# Patient Record
Sex: FEMALE | Race: OTHER | HISPANIC OR LATINO | URBAN - METROPOLITAN AREA
[De-identification: names, ages, dates, MRNs, and addresses within clinical notes are randomized per-mention and may not be internally consistent; named-entity substitution may affect disease eponyms.]

---

## 2018-05-04 ENCOUNTER — EMERGENCY (EMERGENCY)
Facility: HOSPITAL | Age: 23
LOS: 1 days | Discharge: HOME | End: 2018-05-04
Attending: EMERGENCY MEDICINE | Admitting: EMERGENCY MEDICINE
Payer: MEDICAID

## 2018-05-04 VITALS
HEART RATE: 90 BPM | DIASTOLIC BLOOD PRESSURE: 75 MMHG | RESPIRATION RATE: 20 BRPM | OXYGEN SATURATION: 98 % | TEMPERATURE: 98 F | SYSTOLIC BLOOD PRESSURE: 111 MMHG

## 2018-05-04 VITALS — HEIGHT: 63 IN | WEIGHT: 113.1 LBS

## 2018-05-04 LAB
APPEARANCE UR: ABNORMAL
BACTERIA # UR AUTO: ABNORMAL
BILIRUB UR-MCNC: NEGATIVE — SIGNIFICANT CHANGE UP
COLOR SPEC: YELLOW — SIGNIFICANT CHANGE UP
DIFF PNL FLD: ABNORMAL
EPI CELLS # UR: ABNORMAL
GLUCOSE UR QL: NEGATIVE MG/DL — SIGNIFICANT CHANGE UP
HCG UR QL: NEGATIVE — SIGNIFICANT CHANGE UP
KETONES UR-MCNC: ABNORMAL
LEUKOCYTE ESTERASE UR-ACNC: ABNORMAL
NITRITE UR-MCNC: NEGATIVE — SIGNIFICANT CHANGE UP
PH UR: 6 — SIGNIFICANT CHANGE UP (ref 5–8)
PROT UR-MCNC: 15 MG/DL
RBC CASTS # UR COMP ASSIST: ABNORMAL /HPF (ref 0–4)
SP GR SPEC: 1.02 — SIGNIFICANT CHANGE UP (ref 1.01–1.02)
UROBILINOGEN FLD QL: NEGATIVE MG/DL — SIGNIFICANT CHANGE UP
WBC UR QL: SIGNIFICANT CHANGE UP

## 2018-05-04 PROCEDURE — 81001 URINALYSIS AUTO W/SCOPE: CPT

## 2018-05-04 PROCEDURE — 87070 CULTURE OTHR SPECIMN AEROBIC: CPT

## 2018-05-04 PROCEDURE — 81025 URINE PREGNANCY TEST: CPT

## 2018-05-04 PROCEDURE — 87086 URINE CULTURE/COLONY COUNT: CPT

## 2018-05-04 PROCEDURE — 99283 EMERGENCY DEPT VISIT LOW MDM: CPT

## 2018-05-04 RX ORDER — FLUCONAZOLE 150 MG/1
1 TABLET ORAL
Qty: 1 | Refills: 0 | OUTPATIENT
Start: 2018-05-04 | End: 2018-05-04

## 2018-05-04 RX ORDER — FLUCONAZOLE 150 MG/1
150 TABLET ORAL ONCE
Qty: 0 | Refills: 0 | Status: COMPLETED | OUTPATIENT
Start: 2018-05-04 | End: 2018-05-04

## 2018-05-04 RX ADMIN — FLUCONAZOLE 150 MILLIGRAM(S): 150 TABLET ORAL at 21:15

## 2018-05-04 NOTE — ED PROVIDER NOTE - OBJECTIVE STATEMENT
23 yo F presented to ED with c/o vaginal d/c and pruritis x 1 week. Pt has been using OTC topical vagisil cream with no relief. No fevers/chills. No N/V. NO abd pain. no dysuria. No vaginal lesions. NO h/o STDs. Pt is currently not sexually active- last activity 11/2017. Pt denies any PMH and is currently not taking any oral medications.

## 2018-05-04 NOTE — ED PROVIDER NOTE - ATTENDING CONTRIBUTION TO CARE
23 yo F presented to ED with c/o vaginal d/c and pruritis x 1 week. Pt has been using OTC topical vagisil cream with no relief. No fevers/chills. No N/V. NO abd pain. no dysuria. No vaginal lesions. NO h/o STDs. pe in nad abd soft; gyn as per acp; cervical culture, ua

## 2018-05-04 NOTE — ED ADULT TRIAGE NOTE - CHIEF COMPLAINT QUOTE
patient states that she may have a yeast infection and wants to be checked out - states lmp 3/22 and then she began to itch. requesting female MD or PA

## 2018-05-04 NOTE — ED ADULT NURSE NOTE - OBJECTIVE STATEMENT
"I just want someone to check down there" c/o pain x 1 week ago. pt states to think its a yeast infection, denies painful or frequent urination .

## 2018-05-05 LAB
CULTURE RESULTS: SIGNIFICANT CHANGE UP
SPECIMEN SOURCE: SIGNIFICANT CHANGE UP

## 2018-05-06 LAB
CULTURE RESULTS: SIGNIFICANT CHANGE UP
SPECIMEN SOURCE: SIGNIFICANT CHANGE UP

## 2018-05-07 LAB
C TRACH RRNA SPEC QL NAA+PROBE: SIGNIFICANT CHANGE UP
N GONORRHOEA RRNA SPEC QL NAA+PROBE: SIGNIFICANT CHANGE UP
SPECIMEN SOURCE: SIGNIFICANT CHANGE UP

## 2019-06-30 NOTE — ED ADULT NURSE NOTE - CHIEF COMPLAINT QUOTE
patient states that she may have a yeast infection and wants to be checked out - states lmp 3/22 and then she began to itch. requesting female MD or PA Normal for race

## 2020-10-02 NOTE — ED PROVIDER NOTE - CROS ED CARDIOVAS ALL NEG
RX [place] ([place]) reviewed:  reviewed- no concerns  [place] profile:  [email or web address]       negative...

## 2021-01-18 ENCOUNTER — OFFICE VISIT (OUTPATIENT)
Dept: FAMILY MEDICINE | Facility: CLINIC | Age: 26
End: 2021-01-18

## 2021-01-18 VITALS
BODY MASS INDEX: 27.99 KG/M2 | OXYGEN SATURATION: 99 % | TEMPERATURE: 97.6 F | WEIGHT: 168 LBS | DIASTOLIC BLOOD PRESSURE: 68 MMHG | HEIGHT: 65 IN | HEART RATE: 92 BPM | SYSTOLIC BLOOD PRESSURE: 110 MMHG

## 2021-01-18 DIAGNOSIS — F41.9 ANXIETY: ICD-10-CM

## 2021-01-18 DIAGNOSIS — M54.50 CHRONIC BILATERAL LOW BACK PAIN WITHOUT SCIATICA: ICD-10-CM

## 2021-01-18 DIAGNOSIS — M79.662 PAIN OF LEFT LOWER LEG: ICD-10-CM

## 2021-01-18 DIAGNOSIS — G89.29 CHRONIC BILATERAL LOW BACK PAIN WITHOUT SCIATICA: ICD-10-CM

## 2021-01-18 DIAGNOSIS — G47.39 OTHER SLEEP APNEA: ICD-10-CM

## 2021-01-18 DIAGNOSIS — Z00.00 ROUTINE GENERAL MEDICAL EXAMINATION AT A HEALTH CARE FACILITY: Primary | ICD-10-CM

## 2021-01-18 DIAGNOSIS — R06.02 SHORTNESS OF BREATH: ICD-10-CM

## 2021-01-18 PROBLEM — Z76.89 HEALTH CARE HOME: Status: ACTIVE | Noted: 2021-01-18

## 2021-01-18 PROBLEM — Z71.89 ACP (ADVANCE CARE PLANNING): Status: ACTIVE | Noted: 2021-01-18

## 2021-01-18 LAB
CHOLEST SERPL-MCNC: 155 MG/DL (ref 0–199)
CHOLEST/HDLC SERPL: 3 {RATIO} (ref 0–5)
GLUCOSE SERPL-MCNC: 99 MG/DL (ref 60–99)
HDLC SERPL-MCNC: 53 MG/DL (ref 40–150)
LDLC SERPL CALC-MCNC: 91 MG/DL (ref 0–130)
TRIGL SERPL-MCNC: 53 MG/DL (ref 0–149)

## 2021-01-18 PROCEDURE — 99214 OFFICE O/P EST MOD 30 MIN: CPT | Mod: 25 | Performed by: FAMILY MEDICINE

## 2021-01-18 PROCEDURE — 82947 ASSAY GLUCOSE BLOOD QUANT: CPT | Performed by: FAMILY MEDICINE

## 2021-01-18 PROCEDURE — 80061 LIPID PANEL: CPT | Performed by: FAMILY MEDICINE

## 2021-01-18 PROCEDURE — 99385 PREV VISIT NEW AGE 18-39: CPT | Performed by: FAMILY MEDICINE

## 2021-01-18 PROCEDURE — 36415 COLL VENOUS BLD VENIPUNCTURE: CPT | Performed by: FAMILY MEDICINE

## 2021-01-18 RX ORDER — ALBUTEROL SULFATE 90 UG/1
2 AEROSOL, METERED RESPIRATORY (INHALATION) EVERY 6 HOURS
Qty: 1 INHALER | Refills: 1 | Status: SHIPPED | OUTPATIENT
Start: 2021-01-18

## 2021-01-18 SDOH — HEALTH STABILITY: MENTAL HEALTH: HOW MANY STANDARD DRINKS CONTAINING ALCOHOL DO YOU HAVE ON A TYPICAL DAY?: 1 OR 2

## 2021-01-18 SDOH — HEALTH STABILITY: MENTAL HEALTH: HOW OFTEN DO YOU HAVE 6 OR MORE DRINKS ON ONE OCCASION?: NOT ASKED

## 2021-01-18 SDOH — HEALTH STABILITY: MENTAL HEALTH: HOW OFTEN DO YOU HAVE A DRINK CONTAINING ALCOHOL?: 2-3 TIMES A WEEK

## 2021-01-18 ASSESSMENT — MIFFLIN-ST. JEOR: SCORE: 1517.92

## 2021-01-18 ASSESSMENT — PATIENT HEALTH QUESTIONNAIRE - PHQ9
SUM OF ALL RESPONSES TO PHQ QUESTIONS 1-9: 1
5. POOR APPETITE OR OVEREATING: NEARLY EVERY DAY

## 2021-01-18 ASSESSMENT — ANXIETY QUESTIONNAIRES
6. BECOMING EASILY ANNOYED OR IRRITABLE: NEARLY EVERY DAY
2. NOT BEING ABLE TO STOP OR CONTROL WORRYING: MORE THAN HALF THE DAYS
5. BEING SO RESTLESS THAT IT IS HARD TO SIT STILL: NEARLY EVERY DAY
IF YOU CHECKED OFF ANY PROBLEMS ON THIS QUESTIONNAIRE, HOW DIFFICULT HAVE THESE PROBLEMS MADE IT FOR YOU TO DO YOUR WORK, TAKE CARE OF THINGS AT HOME, OR GET ALONG WITH OTHER PEOPLE: SOMEWHAT DIFFICULT
7. FEELING AFRAID AS IF SOMETHING AWFUL MIGHT HAPPEN: NEARLY EVERY DAY
GAD7 TOTAL SCORE: 19
1. FEELING NERVOUS, ANXIOUS, OR ON EDGE: MORE THAN HALF THE DAYS
3. WORRYING TOO MUCH ABOUT DIFFERENT THINGS: NEARLY EVERY DAY

## 2021-01-18 NOTE — PATIENT INSTRUCTIONS
"  Preventive Health Recommendations  Female Ages 21 to 25     Yearly exam:     See your health care provider every year in order to  o Review health changes.   o Discuss preventive care.    o Review your medicines if your doctor has prescribed any.      You should be tested each year for STDs (sexually transmitted diseases).       Talk to your provider about how often you should have cholesterol testing.      Get a Pap test every three years. If you have an abnormal result, your doctor may have you test more often.      If you are at risk for diabetes, you should have a diabetes test (fasting glucose).     Shots:     Get a flu shot each year.     Get a tetanus shot every 10 years.     Consider getting the shot (vaccine) that prevents cervical cancer (Gardasil).    Nutrition:     Eat at least 5 servings of fruits and vegetables each day.    Eat whole-grain bread, whole-wheat pasta and brown rice instead of white grains and rice.    Get adequate Calcium and Vitamin D.     Lifestyle    Exercise at least 150 minutes a week each week (30 minutes a day, 5 days a week). This will help you control your weight and prevent disease.    Limit alcohol to one drink per day.    No smoking.     Wear sunscreen to prevent skin cancer.    See your dentist every six months for an exam and cleaning.  Estimated body mass index is 27.96 kg/m  as calculated from the following:    Height as of this encounter: 1.651 m (5' 5\").    Weight as of this encounter: 76.2 kg (168 lb).  "

## 2021-01-18 NOTE — NURSING NOTE
Ana is here to establish care and have fasting CPX without pap    Pre-visit Screening:  Immunizations:  Declines flu discuss Tdap  Colonoscopy:  NA  Mammogram: NA  Asthma Action Test/Plan:  Diagnosed with asthma at age 20 but no meds done  PHQ9:  None  GAD7:  None  Questioned patient about current smoking habits Pt. has never smoked.  Ok to leave detailed message on voice mail for today's visit only Yes, phone # [phone]

## 2021-01-18 NOTE — PROGRESS NOTES
SUBJECTIVE:   CC: Ana Cano is an 25 year old woman who presents for preventive health visit.     Patient has been advised of split billing requirements and indicates understanding: Yes     Has been tested for covid, neg. Breathing has been difficult for the past 2 months. Stuffy nose, feels like she's not getting enough oxygen in . Thought at some point, wasn't known exposure. Has been told in the past that she has asthma around age 20.    Injured her back in august, during sex and since then has had pain, recently pain into legs. Left leg gets weird sharp pains down to the foot throughout the day. Has to stand with back forward at work, pharmacy tech, is standing all day. Has history of scoliosis.  Also has had anxiety, was treated in the past with alprazolam. Wondering about this. Not really having depression. Personal and family history neg for bipolar.  Has wondered in the past about sleep apnea possibly.    Healthy Habits:    Do you get at least three servings of calcium containing foods daily (dairy, green leafy vegetables, etc.)? yes    Amount of exercise or daily activities, outside of work: none.    Problems taking medications regularly not applicable    Medication side effects: No    Have you had an eye exam in the past two years? yes    Do you see a dentist twice per year? yes    Do you have sleep apnea, excessive snoring or daytime drowsiness?yes    Wants sleep apnea testing.      Today's PHQ-2 Score:   PHQ-2 ( 1999 Pfizer) 1/18/2021   Q1: Little interest or pleasure in doing things 0   Q2: Feeling down, depressed or hopeless 0   PHQ-2 Score 0       Do you feel safe in your environment? Yes        Social History     Tobacco Use     Smoking status: Never Smoker     Smokeless tobacco: Never Used   Substance Use Topics     Alcohol use: Yes     Frequency: 2-3 times a week     Drinks per session: 1 or 2     If you drink alcohol do you typically have >3 drinks per day or >7 drinks per week? No                      Reviewed orders with patient.  Reviewed health maintenance and updated orders accordingly - Yes  BP Readings from Last 3 Encounters:   01/18/21 110/68    Wt Readings from Last 3 Encounters:   01/18/21 76.2 kg (168 lb)                  Patient Active Problem List   Diagnosis     ACP (advance care planning)     Health Care Home     Anxiety     No past surgical history on file.    Social History     Tobacco Use     Smoking status: Never Smoker     Smokeless tobacco: Never Used   Substance Use Topics     Alcohol use: Yes     Frequency: 2-3 times a week     Drinks per session: 1 or 2     No family history on file.      Current Outpatient Medications   Medication Sig Dispense Refill     albuterol (PROAIR HFA/PROVENTIL HFA/VENTOLIN HFA) 108 (90 Base) MCG/ACT inhaler Inhale 2 puffs into the lungs every 6 hours 1 Inhaler 1     fluticasone-salmeterol (ADVAIR) 250-50 MCG/DOSE inhaler Inhale 1 puff into the lungs every 12 hours 1 each 3     sertraline (ZOLOFT) 50 MG tablet Take 1 tablet (50 mg) by mouth daily 90 tablet 0           Pertinent mammograms are reviewed under the imaging tab.  History of abnormal Pap smear:  Due for a pap, will have this done with gynecology     Reviewed and updated as needed this visit by clinical staff  Tobacco  Allergies  Meds  Problems             Reviewed and updated as needed this visit by Provider                No past medical history on file.     ROS:  CONSTITUTIONAL: NEGATIVE for fever, chills, change in weight  INTEGUMENTARU/SKIN: NEGATIVE for worrisome rashes, moles or lesions  EYES: NEGATIVE for vision changes or irritation  ENT: NEGATIVE for ear, mouth and throat problems  RESP: NEGATIVE for significant cough or SOB  BREAST: NEGATIVE for masses, tenderness or discharge  CV: NEGATIVE for chest pain, palpitations or peripheral edema  GI: NEGATIVE for nausea, abdominal pain, heartburn, or change in bowel habits  : NEGATIVE for unusual urinary or vaginal symptoms.  "Periods are regular.  MUSCULOSKELETAL: NEGATIVE for significant arthralgias or myalgia  NEURO: NEGATIVE for weakness, dizziness or paresthesias  PSYCHIATRIC: NEGATIVE for changes in mood or affect    Positives: weight gain, back pain, breathing problems    OBJECTIVE:   /68 (BP Location: Right arm, Patient Position: Sitting, Cuff Size: Adult Large)   Pulse 92   Temp 97.6  F (36.4  C) (Oral)   Ht 1.651 m (5' 5\")   Wt 76.2 kg (168 lb)   LMP 01/07/2021   SpO2 99%   BMI 27.96 kg/m    EXAM:  GENERAL: healthy, alert and no distress  EYES: Eyes grossly normal to inspection, PERRL and conjunctivae and sclerae normal  HENT: ear canals and TM's normal, nose and mouth without ulcers or lesions  NECK: no adenopathy, no asymmetry, masses, or scars and thyroid normal to palpation  RESP: lungs clear to auscultation - no rales, rhonchi or wheezes  BREAST: normal without masses, tenderness or nipple discharge and no palpable axillary masses or adenopathy  CV: regular rate and rhythm, normal S1 S2, no S3 or S4, no murmur, click or rub, no peripheral edema and peripheral pulses strong  ABDOMEN: soft, nontender, no hepatosplenomegaly, no masses and bowel sounds normal   (female): normal female external genitalia, normal urethral meatus, vaginal mucosa pink, moist, well rugated, and normal cervix/adnexa/uterus without masses or discharge  MS: no gross musculoskeletal defects noted, no edema  SKIN: no suspicious lesions or rashes  NEURO: Normal strength and tone, mentation intact and speech normal  PSYCH: mentation appears normal, affect normal/bright    Diagnostic Test Results:  Labs reviewed in Epic  Results for orders placed or performed in visit on 01/18/21 (from the past 24 hour(s))   Glucose Fasting (BFP)   Result Value Ref Range    Glucose 99 60 - 99 mg/dL     Results for orders placed or performed in visit on 01/18/21   Glucose Fasting (BFP)     Status: None   Result Value Ref Range    Glucose 99 60 - 99 mg/dL " "      ASSESSMENT/PLAN:   1. Routine general medical examination at a health care facility  She will see gynecology for her pap.  - Lipid Panel (BFP)  - Glucose Fasting (BFP)    2. Anxiety  Start sertraline, side effects including FDA warning discussed. followup to recheck in 1 month.  - TSH with free T4 reflex ([facility])  - VENOUS COLLECTION  - sertraline (ZOLOFT) 50 MG tablet; Take 1 tablet (50 mg) by mouth daily  Dispense: 90 tablet; Refill: 0    3. Pain of left lower leg  Start with physical therapy, followup to recheck in 1 month.  - PHYSICAL THERAPY REFERRAL; Future    4. Shortness of breath  Possible covid although she tested negative. Also possible history of asthma. Start with daily inhalers and recheck in a month.  - fluticasone-salmeterol (ADVAIR) 250-50 MCG/DOSE inhaler; Inhale 1 puff into the lungs every 12 hours  Dispense: 1 each; Refill: 3  - albuterol (PROAIR HFA/PROVENTIL HFA/VENTOLIN HFA) 108 (90 Base) MCG/ACT inhaler; Inhale 2 puffs into the lungs every 6 hours  Dispense: 1 Inhaler; Refill: 1    5. Chronic bilateral low back pain without sciatica  Referral to physical therapy.  - PHYSICAL THERAPY REFERRAL; Future    6. Other sleep apnea  Referral for sleep study.  - SLEEP EVALUATION & MANAGEMENT REFERRAL - ADULT -[facility] - Numerous Locations - [phone]    Patient has been advised of split billing requirements and indicates understanding: Yes  COUNSELING:   Reviewed preventive health counseling, as reflected in patient instructions       Regular exercise       Healthy diet/nutrition    Estimated body mass index is 27.96 kg/m  as calculated from the following:    Height as of this encounter: 1.651 m (5' 5\").    Weight as of this encounter: 76.2 kg (168 lb).    Weight management plan: Discussed healthy diet and exercise guidelines    She reports that she has never smoked. She has never used smokeless tobacco.        Blanca Osman MD  [facility]  "

## 2021-01-18 NOTE — LETTER
[facility]  [address]  [address]  [facility] [address]  [phone]      January 18, 2021      Ana Cano  [address]   [place] [place] [facility] [address]      EMERGENCY CARE PLAN  Presenting Problem Treatment Plan   Questions or concerns during clinic hours I will call the clinic directly:    [facility]  [address] , [address]  [place] [address]  [phone]   Questions or concerns outside clinic hours  I will call the 24 hour line at [phone]   Patient needs to schedule an appointment  I will call the  scheduling line at [phone]   Same day treatment   I will call the clinic first, then  urgent care and/or  express care if needed   Clinic Care Coordinators Luz Avery RN:  [phone]  [facility] Clinical Support Staff: [phone]    Crisis Services:  Behavioral or Mental Health P (Behavioral Health Providers)   [phone]   Emergency treatment--Immediately CALL 251

## 2021-01-19 ENCOUNTER — VIRTUAL VISIT (OUTPATIENT)
Dept: FAMILY MEDICINE | Facility: CLINIC | Age: 26
End: 2021-01-19
Payer: COMMERCIAL

## 2021-01-19 ENCOUNTER — TELEPHONE (OUTPATIENT)
Dept: FAMILY MEDICINE | Facility: CLINIC | Age: 26
End: 2021-01-19

## 2021-01-19 DIAGNOSIS — F41.9 ANXIETY: ICD-10-CM

## 2021-01-19 DIAGNOSIS — R41.840 ATTENTION DEFICIT: Primary | ICD-10-CM

## 2021-01-19 LAB — TSH SERPL-ACNC: 0.87 MIU/L

## 2021-01-19 PROCEDURE — 99214 OFFICE O/P EST MOD 30 MIN: CPT | Mod: 95 | Performed by: PHYSICIAN ASSISTANT

## 2021-01-19 RX ORDER — HYDROXYZINE HYDROCHLORIDE 25 MG/1
25-50 TABLET, FILM COATED ORAL EVERY 6 HOURS PRN
Qty: 60 TABLET | Refills: 1 | Status: SHIPPED | OUTPATIENT
Start: 2021-01-19 | End: 2021-04-06

## 2021-01-19 SDOH — ECONOMIC STABILITY: FOOD INSECURITY: WITHIN THE PAST 12 MONTHS, THE FOOD YOU BOUGHT JUST DIDN'T LAST AND YOU DIDN'T HAVE MONEY TO GET MORE.: NOT ASKED

## 2021-01-19 SDOH — ECONOMIC STABILITY: TRANSPORTATION INSECURITY
IN THE PAST 12 MONTHS, HAS THE LACK OF TRANSPORTATION KEPT YOU FROM MEDICAL APPOINTMENTS OR FROM GETTING MEDICATIONS?: NOT ASKED

## 2021-01-19 SDOH — ECONOMIC STABILITY: FOOD INSECURITY: WITHIN THE PAST 12 MONTHS, YOU WORRIED THAT YOUR FOOD WOULD RUN OUT BEFORE YOU GOT MONEY TO BUY MORE.: NOT ASKED

## 2021-01-19 SDOH — ECONOMIC STABILITY: TRANSPORTATION INSECURITY
IN THE PAST 12 MONTHS, HAS LACK OF TRANSPORTATION KEPT YOU FROM MEETINGS, WORK, OR FROM GETTING THINGS NEEDED FOR DAILY LIVING?: NOT ASKED

## 2021-01-19 SDOH — ECONOMIC STABILITY: INCOME INSECURITY: HOW HARD IS IT FOR YOU TO PAY FOR THE VERY BASICS LIKE FOOD, HOUSING, MEDICAL CARE, AND HEATING?: NOT ASKED

## 2021-01-19 ASSESSMENT — ANXIETY QUESTIONNAIRES: GAD7 TOTAL SCORE: 19

## 2021-01-19 NOTE — PROGRESS NOTES
Ana is a 25 year old who is being evaluated via a billable video visit.      How would you like to obtain your AVS? MyChart  If the video visit is dropped, the invitation should be resent by: Text to cell phone: [phone]  Will anyone else be joining your video visit? No    Video Start Time: 1:02  Assessment & Plan     Anxiety  Present on history and evidence on ck 7 from yesterday. Discussed diagnosis and how ssris manage this. Patient is willing to attempt zoloft. Recommending starting at half dose for 1-2 weeks then increase to full. Also educated on benzodiazipines are not a good option for chronic management of anxiety due to controlled nature and addictive properties. Will give atarax prn to allow zoloft to take effect and recommending follow up with me for virtual visit in 1 month.   - sertraline (ZOLOFT) 50 MG tablet; Take 1/2 tab (25 mg) daily for 1-2 weeks, then increase to 1 tab (50 mg) daily.  - hydrOXYzine (ATARAX) 25 MG tablet; Take 1-2 tablets (25-50 mg) by mouth every 6 hours as needed for anxiety  -Medication use and side effects discussed with the patient. Patient is in complete understanding and agreement with plan.       Attention deficit  Reported. No noted subjective diagnosis of adhd however does state was on stratterra in the past. Will have formal assessment. Referral given.   - MENTAL HEALTH REFERRAL  - Adult; Assessments and Testing; ADHD; [facility] [phone]; We will contact you to schedule the appointment or please call with any questions      52 minutes spent on the date of the encounter doing chart review, patient visit and documentation     Return in about 1 month (around 2/19/2021) for depression/anxiety follow up virtual .    Venancio Garcia PA-C  [facility]    Stephan Perez is a 25 year old who presents to clinic today for the following health issues     HPI       Anxiety Follow-Up    How are you doing  with your anxiety since your last visit? Worsened     Are you having other symptoms that might be associated with anxiety? No    Have you had a significant life event? No     Are you feeling depressed? No    Do you have any concerns with your use of alcohol or other drugs? No    Social History     Tobacco Use     Smoking status: Never Smoker     Smokeless tobacco: Never Used   Substance Use Topics     Alcohol use: Yes     Frequency: 2-3 times a week     Drinks per session: 1 or 2     Drug use: Never     KAT-7 SCORE 1/18/2021   Total Score 19     PHQ 1/18/2021   PHQ-9 Total Score 1   Q9: Thoughts of better off dead/self-harm past 2 weeks Not at all     Last PHQ-9 1/18/2021   1.  Little interest or pleasure in doing things 0   2.  Feeling down, depressed, or hopeless 0   3.  Trouble falling or staying asleep, or sleeping too much 0   4.  Feeling tired or having little energy 0   5.  Poor appetite or overeating 0   6.  Feeling bad about yourself 0   7.  Trouble concentrating 1   8.  Moving slowly or restless 0   Q9: Thoughts of better off dead/self-harm past 2 weeks 0   PHQ-9 Total Score 1   Difficulty at work, home, or with people Not difficult at all     KAT-7  1/18/2021   1. Feeling nervous, anxious, or on edge 2   2. Not being able to stop or control worrying 2   3. Worrying too much about different things 3   4. Trouble relaxing 3   5. Being so restless that it is hard to sit still 3   6. Becoming easily annoyed or irritable 3   7. Feeling afraid, as if something awful might happen 3   KAT-7 Total Score 19   If you checked any problems, how difficult have they made it for you to do your work, take care of things at home, or get along with other people? Somewhat difficult         Of note, was seen by Dr. Osman yesterday for preventative exam but had other concerns and patient did not feel she had her anxiety concerns addressed appropriately.     States for years has suffered from anxiety. As a child was managed  "with xanax and stratterra for anxiety. Not currently taking any medications and now her stress levels are high and it has effected her concentration. She is also concerned for possible ADHD and does not believe she has been formally diagnosed with this in the past.       Review of Systems   Constitutional, neuro, psych, cardiovascular, pulmonary systems are negative, except as otherwise noted.      Objective    Vitals - Patient Reported  Weight (Patient Reported): 76.2 kg (168 lb)  Height (Patient Reported): 165.1 cm (5' 5\")  BMI (Based on Pt Reported Ht/Wt): 27.96      Vitals:  No vitals were obtained today due to virtual visit.    Physical Exam   GENERAL: Healthy, alert and no distress  EYES: Eyes grossly normal to inspection.  No discharge or erythema, or obvious scleral/conjunctival abnormalities.  RESP: No audible wheeze, cough, or visible cyanosis.  No visible retractions or increased work of breathing.    SKIN: Visible skin clear. No significant rash, abnormal pigmentation or lesions.  NEURO: Cranial nerves grossly intact.  Mentation and speech appropriate for age.  PSYCH: Mentation appears normal, affect normal/bright, judgement and insight intact, normal speech and appearance well-groomed.            Video-Visit Details    Type of service:  Video Visit    Video End Time:1:40    Originating Location (pt. Location): Home    Distant Location (provider location):  [facility]     Platform used for Video Visit: Ad"

## 2021-01-19 NOTE — TELEPHONE ENCOUNTER
Called patient, left msg to call clinic. Need to schedule f/u appointment.  Return in about 1 month (around 2/19/2021) for depression/anxiety follow up anaid .  Ruth Behrens.

## 2021-01-22 ENCOUNTER — TELEPHONE (OUTPATIENT)
Dept: FAMILY MEDICINE | Facility: CLINIC | Age: 26
End: 2021-01-22

## 2021-01-22 NOTE — TELEPHONE ENCOUNTER
We did not reach Ana Cano, we left a message with our contact number [phone].  If we do not hear from them within the next 5 business days we will mail a letter offering our scheduling services.    If they do call to schedule, in the future, we will inform you of the outcome.    Thank you for your referral,  [facility]

## 2021-02-08 ENCOUNTER — OFFICE VISIT (OUTPATIENT)
Dept: FAMILY MEDICINE | Facility: CLINIC | Age: 26
End: 2021-02-08

## 2021-02-08 ENCOUNTER — TELEPHONE (OUTPATIENT)
Dept: FAMILY MEDICINE | Facility: CLINIC | Age: 26
End: 2021-02-08

## 2021-02-08 VITALS
SYSTOLIC BLOOD PRESSURE: 110 MMHG | HEART RATE: 82 BPM | BODY MASS INDEX: 27.66 KG/M2 | WEIGHT: 166 LBS | OXYGEN SATURATION: 97 % | HEIGHT: 65 IN | TEMPERATURE: 98.1 F | DIASTOLIC BLOOD PRESSURE: 60 MMHG

## 2021-02-08 DIAGNOSIS — L30.9 DERMATITIS: Primary | ICD-10-CM

## 2021-02-08 DIAGNOSIS — F41.9 ANXIETY: ICD-10-CM

## 2021-02-08 DIAGNOSIS — L70.0 ACNE VULGARIS: ICD-10-CM

## 2021-02-08 PROCEDURE — 99214 OFFICE O/P EST MOD 30 MIN: CPT | Performed by: PHYSICIAN ASSISTANT

## 2021-02-08 RX ORDER — PREDNISONE 10 MG/1
10 TABLET ORAL 2 TIMES DAILY
Qty: 10 TABLET | Refills: 0 | Status: SHIPPED | OUTPATIENT
Start: 2021-02-08 | End: 2021-02-13

## 2021-02-08 RX ORDER — DOXYCYCLINE HYCLATE 100 MG
100 TABLET ORAL 2 TIMES DAILY
Qty: 180 TABLET | Refills: 0 | Status: SHIPPED | OUTPATIENT
Start: 2021-02-08 | End: 2021-04-06

## 2021-02-08 RX ORDER — NORETHINDRONE ACETATE AND ETHINYL ESTRADIOL AND FERROUS FUMARATE 1MG-20(21)
1 KIT ORAL DAILY
COMMUNITY
Start: 2021-01-26

## 2021-02-08 RX ORDER — FLUOXETINE 10 MG/1
10 CAPSULE ORAL DAILY
Qty: 30 CAPSULE | Refills: 0 | Status: SHIPPED | OUTPATIENT
Start: 2021-02-08 | End: 2021-04-06

## 2021-02-08 RX ORDER — IBUPROFEN 600 MG/1
TABLET, FILM COATED ORAL
COMMUNITY
Start: 2021-01-28

## 2021-02-08 ASSESSMENT — MIFFLIN-ST. JEOR: SCORE: 1508.85

## 2021-02-08 NOTE — PROGRESS NOTES
"CC: Skin reaction, acne, mental health    History:  Skin reaction  Crystal tried a new acne product for the first time on her face last night in the shower. PanOxl 10% benzoyl peroxide. Seemed normal, but then waking up this morning, right eye upper and lower eyelid were itching and swollen. No irritation of eye, tearing, discharge. Over the course of the day, right eye seems to get better, but left eye is getting worse. Has tried 2 other benzoyl peroxide products, and both have caused similar reactions either immediately or after a few uses.    Acne:  Wonders what else she can use besides this. Has been on doxycycline BID before at this worked well. She also may return to dermatology in the near future to consider Accutane therapy.     Anxiety:  Was seen by my colleague Dr. Osman 1/18/2021 for physical and had mentioned history of anxiety that she used to have to take Xanax for. Dr Osman had prescribed sertraline, which patient started taking, but asks me today if is normal to have frequent nightmares while taking. She explains this is totally new for her to have this. Has not tried other daily medications in the past.     PMH, MEDICATIONS, ALLERGIES, SOCIAL AND FAMILY HISTORY in [facility] and reviewed by me personally.    ROS negative other than the symptoms noted above in the HPI.    Examination   /60 (BP Location: Left arm, Patient Position: Sitting, Cuff Size: Adult Large)   Pulse 82   Temp 98.1  F (36.7  C) (Oral)   Ht 1.651 m (5' 5\")   Wt 75.3 kg (166 lb)   LMP 02/04/2021 (Exact Date)   SpO2 97%   BMI 27.62 kg/m         Constitutional: Sitting comfortably, in no acute distress. Vital signs noted  Eyes: pupils equal round reactive to light and accomodation, extra ocular movements intact  Ears: external canals and TMs free of abnormalities  Nose: patent, without mucosal abnormalities  Mouth and throat: without erythema or lesions of the mucosa  Neck:  no adenopathy, trachea midline and normal to " palpation, thyroid normal to palpation  Cardiovascular:  regular rate and rhythm, no murmurs, clicks, or gallops  Respiratory:  normal respiratory rate and rhythm, lungs clear to auscultation  SKIN: No jaundice/pallor. Mild edema and erythema of right and left upper eyelids.   Psychiatric: mentation appears normal and affect normal/bright        A/P    ICD-10-CM    1. Dermatitis  L30.9 predniSONE (DELTASONE) 10 MG tablet   2. Acne vulgaris  L70.0 doxycycline hyclate (VIBRA-TABS) 100 MG tablet   3. Anxiety  F41.9 FLUoxetine (PROZAC) 10 MG capsule       DISCUSSION:  Allergic dermatitis: For this allergic dermatitis, reassured she is already seeing significant improvement.  Ideally try taking Benadryl and/or Allegra/Zyrtec/Claritin. If still happening over the next 2-3 days, okay to start prednisone 10 mg twice daily with food. Warned of side effects like drowsiness, jitteriness, nausea, diarrhea, constipation, weight changes, irritability, mood swings, and to contact me if noted.     Acne: Avoid benzoly peroxide medications in the future. For ongoing acne, consider Differin gel every other night    Will also restart on doxycycline 1 pill twice daily with food for 3 months. Avoid taking with birth control as OCP contains iron.    Anxiety:  Significant side efffects on sertraline 50 mg,so will do trial on fluoxetine 10 instead. Can switch directly from one onto the other. Warned of side effects, and advised take with food. Recheck in 1 month virtual or in person, or contact me sooner if side effects, or nightmares not resolved.     follow up visit: 1 month    Lenka Lynn PA-C  [facility]

## 2021-02-08 NOTE — Clinical Note
JAELYN. Pt had nightmares on sertraline brought up at her unrelated appt. Advised switch to fluoxetine, recheck in 1 month

## 2021-02-08 NOTE — LETTER
[facility]  [address], [address]  [place]  [address]    February 8, 2021        Ana Cano  [address] [address]   [place] [address]              To Whom It May Concern:    Ana is a patient at our clinic seen on the evening of 2/8/2021 for an acute illness. Please excuse her from work responsibilities 2/8/2021. She may return to work without restriction 2/9/2021.     If you have any further questions or problems, please contact our office at [phone].      Lenka Lynn PA-C

## 2021-02-08 NOTE — NURSING NOTE
Ana is here for a rash that happens when she tries to use acne products on her face. They make her eyes swell up.    Pre-Visit Screening:  Immunizations:declined shots  Colonoscopy:NA  Mammogram:NA  Asthma Action Test/Plan:Na  PHQ9:NA  GAD7:Na  Questioned patient about current smoking habits Pt.never smoked  OK to leave a detailed message on voice mail for today's visit yes, phone # [phone]

## 2021-02-08 NOTE — PATIENT INSTRUCTIONS
Avoid benzoly peroxide medications in the future    Significan side efffects on sertraline,so will do trial on fluoxetine instead. Recheck in 1 month virtual or in person.    For the rash, ideally try Benadryl and/or Allegra/Zyrtec/Claritin. If still happening, okay to start prednisone 10 mg twice daily with food.    For ongoing acne, consider Differin gel every other night.     Will also restart on doxycycline 1 pill twice daily with food for 3 months. Avoid taking with birth control.

## 2021-02-08 NOTE — TELEPHONE ENCOUNTER
Pt needs a letter saying she missed work today and came to the doctor, she is okay with you sending it by ghassan. Thanks

## 2021-03-07 ENCOUNTER — HEALTH MAINTENANCE LETTER (OUTPATIENT)
Age: 26
End: 2021-03-07

## 2021-03-21 ENCOUNTER — OFFICE VISIT (OUTPATIENT)
Dept: URGENT CARE | Facility: URGENT CARE | Age: 26
End: 2021-03-21
Payer: COMMERCIAL

## 2021-03-21 VITALS
HEIGHT: 65 IN | OXYGEN SATURATION: 100 % | RESPIRATION RATE: 18 BRPM | DIASTOLIC BLOOD PRESSURE: 71 MMHG | WEIGHT: 166 LBS | BODY MASS INDEX: 27.66 KG/M2 | SYSTOLIC BLOOD PRESSURE: 108 MMHG | HEART RATE: 90 BPM | TEMPERATURE: 98.3 F

## 2021-03-21 DIAGNOSIS — L08.9 LOCAL SKIN INFECTION: Primary | ICD-10-CM

## 2021-03-21 PROCEDURE — 99214 OFFICE O/P EST MOD 30 MIN: CPT | Performed by: PHYSICIAN ASSISTANT

## 2021-03-21 RX ORDER — MUPIROCIN 20 MG/G
OINTMENT TOPICAL 3 TIMES DAILY
Qty: 30 G | Refills: 0 | Status: SHIPPED | OUTPATIENT
Start: 2021-03-21 | End: 2021-03-28

## 2021-03-21 RX ORDER — CEPHALEXIN 500 MG/1
500 CAPSULE ORAL 3 TIMES DAILY
Qty: 21 CAPSULE | Refills: 0 | Status: SHIPPED | OUTPATIENT
Start: 2021-03-21 | End: 2021-03-28

## 2021-03-21 ASSESSMENT — MIFFLIN-ST. JEOR: SCORE: 1508.85

## 2021-03-21 NOTE — PATIENT INSTRUCTIONS
Exam today suggestive of skin infection. Please take antibiotic as prescribed. Please follow up if any worsening symptoms.

## 2021-03-21 NOTE — PROGRESS NOTES
"Assessment & Plan     Local skin infection, dorsum of left hand  Suspect local skin infection from initial insect bite.  Keflex is prescribed today.  Mupirocin ointment is also prescribed.  Keep monitoring symptoms.  Follow-up if any worsening symptoms.  She agrees with the plan.  - cephALEXin (KEFLEX) 500 MG capsule  Dispense: 21 capsule; Refill: 0  - mupirocin (BACTROBAN) 2 % external ointment  Dispense: 30 g; Refill: 0         Return in about 1 week (around 3/28/2021) for Symptoms failing to improve.    Ira Branch PA-C  [facility] DIEGO Perez is a 25 year old female who presents to clinic today for the following health issues:  Chief Complaint   Patient presents with     Urgent Care     Derm Problem     left hand red swollen, 2 bumps on right hand     HPI      Derm problem    Onset of symptoms was a few days ago.  Course of illness is worsening.    Severity moderate  Current and Associated symptoms: swelling, redness, pain on the dorsum of the left hand.  She reports possibly an insect bite on the dorsum of the left hand just below the left index finger several days ago.  A few days ago tried squeezing that area. She also has 2 other bumps noted on the right hand. They were itchy at first, not so much now.  Denies fever/chills. No new lotions, soaps detergents. No injury to the affected area.  Treatment measures tried include None tried.  Predisposing factors include None.    Of note, patient was prescribed Doxycycline for Acne about a month ago, has not been taking it.       Review of Systems  Constitutional, HEENT, cardiovascular, pulmonary, gi and gu systems are negative, except as otherwise noted.      Objective    /71   Pulse 90   Temp 98.3  F (36.8  C) (Oral)   Resp 18   Ht 1.651 m (5' 5\")   Wt 75.3 kg (166 lb)   SpO2 100%   BMI 27.62 kg/m    Physical Exam   GENERAL: healthy, alert and no distress  SKIN: There is erythema noted on the dorsum of " the left hand by the second and third metacarpal area, there is a scab noted at the first MCP area, redness/erythema is spreading distally on the second and third fingers.  There is tenderness to palpation. Red streaking noted. No evidence of abscess. ROM left wrist and fingers is normal.   On the dorsum of the right hand she has 2 vesicles noted. One is below the 3rd finger and second one is lateral to the pinky. No tenderness to palpation. No drainage.

## 2021-04-05 NOTE — PROGRESS NOTES
"Assessment & Plan   Problem List Items Addressed This Visit        Other    Anxiety - Primary    Relevant Medications    sertraline (ZOLOFT) 50 MG tablet    sertraline (ZOLOFT) 100 MG tablet         1. Anxiety  I will increase sertraline, side effects discussed. With libido concerns, let me know--we could consider adding wellbutrin.   - sertraline (ZOLOFT) 100 MG tablet; Take 1 tablet (100 mg) by mouth daily  Dispense: 90 tablet; Refill: 0             FUTURE APPOINTMENTS:       - Follow-up visit in 3 months    No follow-ups on file.    Blanca Osman MD  [facility]    Subjective     Nursing Notes:   Radha Caruso, CMA  4/6/2021  9:15 AM  Signed  Ana is here for fasting med check    Pre-visit Screening:  Immunizations:  up to date  Colonoscopy:  NA  Mammogram: NA  Asthma Action Test/Plan:  NA  PHQ9:  Done today  GAD7:  Done today  Questioned patient about current smoking habits Pt. has never smoked.  Ok to leave detailed message on voice mail for today's visit only Yes, phone # [phone]           Ana Cano is a 25 year old female who presents to clinic today for the following health issues   HPI     Here to followup on her anxiety, has been on sertraline since January, would like to increase the dose. Initially was having nightmares but this is better. Was given fluoxetine and hydroxyzine but didn't change this.  She has also noted some decreased libido, but this started before starting the sertraline.    Has some periods irregular--it was a week late once. But then she got it. Now waiting for next period.      Review of Systems   Constitutional, HEENT, cardiovascular, pulmonary, gi and gu systems are negative, except as otherwise noted.      Objective    /64 (BP Location: Left arm, Patient Position: Sitting, Cuff Size: Adult Large)   Pulse 95   Temp 98.2  F (36.8  C) (Oral)   Ht 1.657 m (5' 5.25\")   Wt 74.8 kg (165 lb)   LMP 03/11/2021   SpO2 97%   BMI 27.25 kg/m  "   Body mass index is 27.25 kg/m .  Physical Exam   GENERAL: healthy, alert and no distress  MS: no gross musculoskeletal defects noted, no edema  PSYCH: mentation appears normal, affect normal/bright

## 2021-04-06 ENCOUNTER — OFFICE VISIT (OUTPATIENT)
Dept: FAMILY MEDICINE | Facility: CLINIC | Age: 26
End: 2021-04-06

## 2021-04-06 VITALS
BODY MASS INDEX: 27.49 KG/M2 | SYSTOLIC BLOOD PRESSURE: 100 MMHG | TEMPERATURE: 98.2 F | DIASTOLIC BLOOD PRESSURE: 64 MMHG | HEIGHT: 65 IN | HEART RATE: 95 BPM | WEIGHT: 165 LBS | OXYGEN SATURATION: 97 %

## 2021-04-06 DIAGNOSIS — F41.9 ANXIETY: Primary | ICD-10-CM

## 2021-04-06 PROCEDURE — 99214 OFFICE O/P EST MOD 30 MIN: CPT | Performed by: FAMILY MEDICINE

## 2021-04-06 RX ORDER — SERTRALINE HYDROCHLORIDE 100 MG/1
100 TABLET, FILM COATED ORAL DAILY
Qty: 90 TABLET | Refills: 0 | Status: SHIPPED | OUTPATIENT
Start: 2021-04-06

## 2021-04-06 ASSESSMENT — ANXIETY QUESTIONNAIRES
IF YOU CHECKED OFF ANY PROBLEMS ON THIS QUESTIONNAIRE, HOW DIFFICULT HAVE THESE PROBLEMS MADE IT FOR YOU TO DO YOUR WORK, TAKE CARE OF THINGS AT HOME, OR GET ALONG WITH OTHER PEOPLE: SOMEWHAT DIFFICULT
6. BECOMING EASILY ANNOYED OR IRRITABLE: SEVERAL DAYS
3. WORRYING TOO MUCH ABOUT DIFFERENT THINGS: SEVERAL DAYS
GAD7 TOTAL SCORE: 6
7. FEELING AFRAID AS IF SOMETHING AWFUL MIGHT HAPPEN: NOT AT ALL
1. FEELING NERVOUS, ANXIOUS, OR ON EDGE: SEVERAL DAYS
2. NOT BEING ABLE TO STOP OR CONTROL WORRYING: SEVERAL DAYS
5. BEING SO RESTLESS THAT IT IS HARD TO SIT STILL: SEVERAL DAYS

## 2021-04-06 ASSESSMENT — PATIENT HEALTH QUESTIONNAIRE - PHQ9
5. POOR APPETITE OR OVEREATING: SEVERAL DAYS
SUM OF ALL RESPONSES TO PHQ QUESTIONS 1-9: 1

## 2021-04-06 ASSESSMENT — MIFFLIN-ST. JEOR: SCORE: 1508.28

## 2021-04-06 NOTE — PATIENT INSTRUCTIONS
Assessment & Plan   Problem List Items Addressed This Visit        Other    Anxiety - Primary    Relevant Medications    sertraline (ZOLOFT) 50 MG tablet    sertraline (ZOLOFT) 100 MG tablet         1. Anxiety  I will increase sertraline, side effects discussed. With libido concerns, let me know--we could consider adding wellbutrin.   - sertraline (ZOLOFT) 100 MG tablet; Take 1 tablet (100 mg) by mouth daily  Dispense: 90 tablet; Refill: 0             FUTURE APPOINTMENTS:       - Follow-up visit in 3 months    No follow-ups on file.    Blanca Osman MD  [facility]

## 2021-04-06 NOTE — NURSING NOTE
Ana is here for fasting med check    Pre-visit Screening:  Immunizations:  up to date  Colonoscopy:  NA  Mammogram: NA  Asthma Action Test/Plan:  NA  PHQ9:  Done today  GAD7:  Done today  Questioned patient about current smoking habits Pt. has never smoked.  Ok to leave detailed message on voice mail for today's visit only Yes, phone # [phone]

## 2021-04-07 ASSESSMENT — ANXIETY QUESTIONNAIRES: GAD7 TOTAL SCORE: 6

## 2021-05-13 ENCOUNTER — OFFICE VISIT (OUTPATIENT)
Dept: URGENT CARE | Facility: URGENT CARE | Age: 26
End: 2021-05-13
Payer: COMMERCIAL

## 2021-05-13 VITALS
WEIGHT: 168.3 LBS | RESPIRATION RATE: 16 BRPM | SYSTOLIC BLOOD PRESSURE: 120 MMHG | BODY MASS INDEX: 27.79 KG/M2 | OXYGEN SATURATION: 99 % | HEART RATE: 90 BPM | DIASTOLIC BLOOD PRESSURE: 80 MMHG | TEMPERATURE: 98.4 F

## 2021-05-13 DIAGNOSIS — B02.9 HERPES ZOSTER WITHOUT COMPLICATION: Primary | ICD-10-CM

## 2021-05-13 PROCEDURE — 99213 OFFICE O/P EST LOW 20 MIN: CPT | Performed by: FAMILY MEDICINE

## 2021-05-13 RX ORDER — VALACYCLOVIR HYDROCHLORIDE 1 G/1
1000 TABLET, FILM COATED ORAL 3 TIMES DAILY
Qty: 21 TABLET | Refills: 0 | Status: SHIPPED | OUTPATIENT
Start: 2021-05-13 | End: 2021-05-20

## 2021-05-13 NOTE — PROGRESS NOTES
ASSESSMENT/  PLAN:  Herpes zoster without complication     - valACYclovir (VALTREX) 1000 mg tablet; Take 1 tablet (1,000 mg) by mouth 3 times daily for 7 days    Since she has had no rash improvement with antibiotic or steroid cream,  Discussed other possibilitities including shingles,  Erythema multiforme   .  Triamcinolone cream, apply bid for itching, inflammation-  Has previous RX- apply bid    Follow-up with dermatology if persistent symptoms-  She may need skin biopsy for diagnosis             ----------------------------------------------------------------------------------------------------------------------     SUBJECTIVE:  Chief Complaint   Patient presents with     RECHECK     rash on arm        Ana Cano is a 25 year old female who presents to the clinic today for a rash.  Onset of rash was 2 month(s) ago.   Rash is gradual onset, still present and constant.  Location of the rash: left dorsal arm.  Quality/symptoms of rash: itching, burning and red , raised  Symptoms are moderate and rash seems to be not changing over the course of time.  She was seen in 2 clinics and dermatology-  Was treated with oral antibiotic/ topical antibiotic (mupiricin) and steroid cream (triamcinolone)-  She has not noted improvement  No sting from an insect-  Has not applied neosporin/ bacitracin.  The area does not rub against anything irritating    Previous history of a similar rash? No  Recent exposure history: none known    Associated symptoms include: nothing.  Patient denies: sore throat and URI symptoms.    History reviewed. No pertinent past medical history.  Patient Active Problem List   Diagnosis     ACP (advance care planning)     Health Care Home     Anxiety     Attention deficit       ALLERGIES:  Patient has no known allergies.    MEDs  albuterol (PROAIR HFA/PROVENTIL HFA/VENTOLIN HFA) 108 (90 Base) MCG/ACT inhaler, Inhale 2 puffs into the lungs every 6 hours  AUROVELA FE 1/20 1-20 MG-MCG tablet, Take  1 tablet by mouth daily  fluticasone-salmeterol (ADVAIR) 250-50 MCG/DOSE inhaler, Inhale 1 puff into the lungs every 12 hours  ibuprofen (ADVIL/MOTRIN) 600 MG tablet,   sertraline (ZOLOFT) 100 MG tablet, Take 1 tablet (100 mg) by mouth daily  sertraline (ZOLOFT) 50 MG tablet,     No current facility-administered medications on file prior to visit.       Social History     Tobacco Use     Smoking status: Never Smoker     Smokeless tobacco: Never Used   Substance Use Topics     Alcohol use: Yes     Frequency: 2-3 times a week     Drinks per session: 1 or 2       Family History   Problem Relation Age of Onset     Breast Cancer Mother      Nephrolithiasis Father          ROS:  CONSTITUTIONAL:NEGATIVE for fever, chills,   EYES: NEGATIVE for vision changes or irritation  ENT/MOUTH: NEGATIVE for ear, mouth and throat problems  RESP:NEGATIVE for significant cough or SOB    EXAM:   /80 (BP Location: Right arm, Patient Position: Chair, Cuff Size: Adult Regular)   Pulse 90   Temp 98.4  F (36.9  C) (Oral)   Resp 16   Wt 76.3 kg (168 lb 4.8 oz)   SpO2 99%   Breastfeeding No   BMI 27.79 kg/m    GENERAL: alert, no acute distress.  SKIN: Rash description:    Distribution: localized and dermatomal  Location: left dorsal upper arm    Color: red and two areas  6 x 7 cm and proximal 5 x 6 cm, ,  Lesion type: patch, erythematous, raised,  Mild bumpy texture, no blisters,   with warmth and inflammation       EYES: EOMI,   conjunctiva clear  HENT: External ears with no swelling or lesions   Nose and lips without  Swelling, ulcers, erythema or lesions  NECK: normal pain free ROM  RESP: no labored respirations, no tachypnea  EXTREMITIES:   Full ROM without expression of pain or limitation x 4 extremities  NEURO: Normal strength and tone, ambulation without difficulty,   normal speech and mentation  PSYCH: mentation and affect appears normal and patient appearance--appropriately groomed

## 2021-05-13 NOTE — PATIENT INSTRUCTIONS
Patient Education     Shingles (Herpes Zoster)     Talk to your healthcare provider about the shingles vaccine.   Shingles is also called herpes zoster. It's a painful skin rash caused by the herpes zoster virus. This is the same virus that causes chickenpox. After a person has chickenpox, the virus stays inactive in the nerve cells. Years later, the virus can become active again and travel along the nerve to the skin. Most people have shingles only once. But it's possible to have it more than once.   Who is at risk for shingles?  Anyone who has ever had chickenpox can get shingles. But your risk is greater if you:     Are age 50 or older    Have an illness that weakens your immune system, such as HIV/AIDS    Have cancer, especially Hodgkin disease or lymphoma    Take medicines that weaken your immune system  What are the symptoms of shingles?    The first sign of shingles is often pain, burning, tingling, or itching on one part of your face or body. You may also feel as if you have the flu, with fever and chills.    A red rash with small blisters appears in a few days. The rash may look as follows:   ? The blisters can occur anywhere, but they re most common on the back, chest, or belly (abdomen).  ? They usually appear on only one side of the body, spreading along the nerve pathway where the virus is reactivating.   ? The rash can also form around an eye, along one side of the face or neck, or in the mouth.  ? In a few people, often those with a weak immune system, shingles appear on more than one part of the body at once.    After a few days, the blisters become dry and form a crust. The crust falls off in days to weeks. The blisters generally don't leave scars. But they can in severe cases. Or if someone has a weak immune system.    How is shingles treated?  For most people, shingles heals on its own in a few days or weeks. But treatment is advised to help ease pain, speed healing, and reduce the risk of  complications. Antiviral medicines are most often only prescribed if you are seen by a healthcare provider within the first 72 hours of having the rash. But antiviral medicines may be prescribed even after 72 hours if someone's immune system is weak. Or if the infection is extensive, severe, or isn't going away. To reduce symptoms:     Apply ice packs or cool compresses, or soak in a cool bath. To make an ice pack, put ice cubes in a plastic bag that seals at the top. Wrap the bag in a clean, thin towel or cloth. Never put ice or an ice pack directly on the skin.    Use calamine lotion to calm itchy skin.    Ask your provider about over-the-counter pain relievers. If your pain is severe, your provider may prescribe stronger pain medicines.  What are possible complications of shingles?   Shingles often goes away with no lasting effects. But some people have complications during or after the infection comes out:     Postherpetic neuralgia. This is the most common complication. It's more likely as people age, especially after age 60. It' is nerve pain at the place where the rash used to be. It can range from mild to severe. It can last for only a few days, or for months or even years after you have had shingles. Antiviral medicines given during the first 72 hours of the rash can reduce the chance of postherpetic neuralgia. Other medicines can be prescribed to help ease the pain and improve quality of life.    Bacterial infection. Shingles blisters may get infected with bacteria. Depending on the severity of the infection, topical, oral or IV (intravenous) antibiotic medicine is used to treat the infection.    Eye problems. If you have shingles on the face, see your healthcare provider right away. Shingles can cause serious problems with vision, and even blindness.  In very rare cases, shingles can also lead to pneumonia, hearing problems, brain inflammation, or even death.    When to get medical care  Call your  healthcare provider if you have any of these:     New symptoms or symptoms that don t go away with treatment    A rash or blisters near your eye    More drainage, fever, or rash after treatment    Severe pain that doesn t go away  How can shingles be prevented?  You can only get shingles if you have had chickenpox in the past. Someone who never had chickenpox can get the virus from you. But instead of have shingles, the person may get chickenpox. Until your blisters form scabs, don't have any contact with others, especially the following:     Pregnant women who have never had chickenpox or the vaccine    Babies who were born early (premature) or who had low weight at birth    People with weak immune systems (such as people getting chemotherapy for cancer, people who have had organ transplants, or people with HIV infections), particularly if they have never had chicken pox.  The shingles vaccine  The recombinant zoster vaccine (RZV) shingles vaccine is available to help prevent shingles or make it less painful.   You should get the RZV shingles vaccine if you are healthy and age 50 or older, even if you've had shingles in the past. Two shots of the RZV vaccine are recommended. You should get the second RZV shot 2 to 6 months after the first. The vaccine makes it less likely that you will develop shingles. If you do develop shingles, your symptoms will likely be milder than if you hadn t been vaccinated. RZV is also advised even if you had the older shingles vaccine (zoster live vaccine, ZVL) in the past. That's because the RZV vaccine works better and protects you from shingles longer.   Talk with your healthcare provider about the best time to get vaccinated.   [employer] last reviewed this educational content on 6/1/2019 2000-2021 [employer]. All rights reserved. This information is not intended as a substitute for professional medical care. Always follow your healthcare professional's  instructions.

## 2021-07-01 ENCOUNTER — MYC REFILL (OUTPATIENT)
Dept: FAMILY MEDICINE | Facility: CLINIC | Age: 26
End: 2021-07-01

## 2021-07-01 DIAGNOSIS — F41.9 ANXIETY: ICD-10-CM

## 2021-07-01 RX ORDER — SERTRALINE HYDROCHLORIDE 100 MG/1
100 TABLET, FILM COATED ORAL DAILY
Qty: 90 TABLET | Refills: 0 | Status: CANCELLED | OUTPATIENT
Start: 2021-07-01

## 2021-07-02 NOTE — TELEPHONE ENCOUNTER
Patient is requesting a refill of  Pending Prescriptions:                       Disp   Refills    sertraline (ZOLOFT) 100 MG tablet         90 tab*0            Sig: Take 1 tablet (100 mg) by mouth daily    Patient is due for 3 month med check follow up. She was informed of this via [facility].

## 2021-08-11 DIAGNOSIS — G89.29 CHRONIC BILATERAL LOW BACK PAIN WITHOUT SCIATICA: ICD-10-CM

## 2021-08-11 DIAGNOSIS — M54.50 CHRONIC BILATERAL LOW BACK PAIN WITHOUT SCIATICA: ICD-10-CM

## 2021-08-11 DIAGNOSIS — M79.662 PAIN OF LEFT LOWER LEG: ICD-10-CM

## 2021-08-15 ENCOUNTER — OFFICE VISIT (OUTPATIENT)
Dept: URGENT CARE | Facility: URGENT CARE | Age: 26
End: 2021-08-15
Payer: COMMERCIAL

## 2021-08-15 VITALS
RESPIRATION RATE: 16 BRPM | DIASTOLIC BLOOD PRESSURE: 60 MMHG | TEMPERATURE: 99.3 F | HEART RATE: 87 BPM | SYSTOLIC BLOOD PRESSURE: 110 MMHG | OXYGEN SATURATION: 100 % | WEIGHT: 172 LBS | BODY MASS INDEX: 28.4 KG/M2

## 2021-08-15 DIAGNOSIS — J02.9 PHARYNGITIS, UNSPECIFIED ETIOLOGY: Primary | ICD-10-CM

## 2021-08-15 LAB
DEPRECATED S PYO AG THROAT QL EIA: NEGATIVE
MONOCYTES NFR BLD AUTO: NEGATIVE %

## 2021-08-15 PROCEDURE — 87651 STREP A DNA AMP PROBE: CPT | Performed by: PHYSICIAN ASSISTANT

## 2021-08-15 PROCEDURE — 86308 HETEROPHILE ANTIBODY SCREEN: CPT | Performed by: PHYSICIAN ASSISTANT

## 2021-08-15 PROCEDURE — 36415 COLL VENOUS BLD VENIPUNCTURE: CPT | Performed by: PHYSICIAN ASSISTANT

## 2021-08-15 PROCEDURE — 99213 OFFICE O/P EST LOW 20 MIN: CPT | Performed by: PHYSICIAN ASSISTANT

## 2021-08-15 RX ORDER — BENZONATATE 100 MG/1
100 CAPSULE ORAL 3 TIMES DAILY PRN
Qty: 30 CAPSULE | Refills: 0 | Status: SHIPPED | OUTPATIENT
Start: 2021-08-15 | End: 2021-08-25

## 2021-08-15 RX ORDER — IBUPROFEN 800 MG/1
800 TABLET, FILM COATED ORAL EVERY 8 HOURS PRN
Qty: 30 TABLET | Refills: 0 | Status: SHIPPED | OUTPATIENT
Start: 2021-08-15

## 2021-08-15 NOTE — PROGRESS NOTES
Pharyngitis, unspecified etiology  - Mononucleosis screen; Future  - Streptococcus A Rapid Screen w/Reflex to PCR - Clinic Collect  - Mononucleosis screen  - benzonatate (TESSALON) 100 MG capsule; Take 1 capsule (100 mg) by mouth 3 times daily as needed for cough  - phenol (CHLORASEPTIC) 1.4 % spray; Take 1 spray (1 mL) by mouth every hour as needed for sore throat  - Group A Streptococcus PCR Throat Swab  - ibuprofen (ADVIL/MOTRIN) 800 MG tablet; Take 1 tablet (800 mg) by mouth every 8 hours as needed for moderate pain    20 minutes spent on the date of the encounter doing chart review, history and exam, documentation and further activities per the note     See Patient Instructions  Patient Instructions     Patient Education     Self-Care for Sore Throats     Sore throats happen for many reasons, such as colds, allergies, cigarette smoke, air pollution, and infections caused by viruses or bacteria. In any case, your throat becomes red and sore. Your goal for self-care is to reduce your discomfort while giving your throat a chance to heal.  Moisten and soothe your throat  Tips include the following:    Try a sip of water first thing after waking up.    Keep your throat moist by drinking 6 or more glasses of clear liquids every day.    Run a cool-air humidifier in your room overnight.    Stay away from cigarette smoke.     Check the air quality index,if air pollution gives you a sore throat. On high pollution days, try to limit outdoor time.    Suck on throat lozenges, cough drops, hard candy, ice chips, or frozen fruit-juice bars. Use the sugar-free versions if your diet or medical condition requires them.  Gargle to ease irritation  Gargling every hour or 2 can ease irritation. Try gargling with 1 of these solutions:    1/4 teaspoon of salt in 1/2 cup of warm water    An over-the-counter anesthetic gargle  Use medicine for more relief  Over-the-counter medicine can reduce sore throat symptoms. Ask your pharmacist  if you have questions about which medicine to use. To prevent possible medicine interactions, let the pharmacist know what medicines you take. To decrease symptoms:    Ease pain with anesthetic sprays. Aspirin or an aspirin substitute also helps. Remember, never give aspirin to anyone 18 or younger. Don't take aspirin if you are already taking blood thinners.     For sore throats caused by allergies, try antihistamines to block the allergic reaction.  Unless a sore throat is caused by a bacterial infection, antibiotics won t help you.  Prevent future sore throats  Prevention tips include:    Stop smoking or reduce contact with secondhand smoke. Smoke irritates the tender throat lining.    Limit contact with pets and with allergy-causing substances, such as pollen and mold.    Wash your hands often when you re around someone with a sore throat or cold. This will keep viruses or bacteria from spreading.    Limit outdoor time when air pollution is bad.    Don t strain your vocal cords.  When to call your healthcare provider  Contact your healthcare provider if you have:    Fever of 100.4 F (38.0 C) or higher, or as directed by your healthcare provider    White spots on the throat    Great Trouble swallowing    A skin rash    Recent exposure to someone else with strep bacteria    Severe hoarseness and swollen glands in the neck or jaw  Call 911  Call 911 if any of the following occur:    Trouble breathing or catching your breath    Drooling and problems swallowing    Wheezing    Unable to talk    Feeling dizzy or faint    Feeling of doom  [employer] last reviewed this educational content on 9/1/2019 2000-2021 [employer]. All rights reserved. This information is not intended as a substitute for professional medical care. Always follow your healthcare professional's instructions.               Vaughn Springer PA-C  [facility]    Subjective   25 year old who presents to clinic today for  the following health issues:    Pharyngitis       HPI     Acute Illness  Acute illness concerns: Throat pain   Onset/Duration: Since Friday   Symptoms:  Fever: no  Chills/Sweats: no  Headache (location?): no  Sinus Pressure: no  Conjunctivitis:  no  Ear Pain: no  Rhinorrhea: no  Congestion: no  Sore Throat: YES (9-10/10)- Hx of strep throat- last year was the last time.   Cough: no  Wheeze: no  Decreased Appetite: no  Nausea: no  Vomiting: no  Diarrhea: no  Dysuria/Freq.: no  Dysuria or Hematuria: no  Fatigue/Achiness: YES  Sick/Strep Exposure: None   Therapies tried and outcome: Ibuprofen       Review of Systems   Review of Systems   See HPI     Objective    Temp: 99.3  F (37.4  C) Temp src: Oral BP: 110/60 Pulse: 87   Resp: 16 SpO2: 100 %       Physical Exam   Physical Exam  Constitutional:       General: She is not in acute distress.     Appearance: Normal appearance. She is normal weight. She is not ill-appearing, toxic-appearing or diaphoretic.   HENT:      Head: Normocephalic and atraumatic.      Nose: Nose normal. No congestion or rhinorrhea.      Mouth/Throat:      Lips: Pink.      Mouth: Mucous membranes are moist.      Palate: No mass.      Pharynx: Pharyngeal swelling and posterior oropharyngeal erythema present. No oropharyngeal exudate or uvula swelling.      Tonsils: No tonsillar exudate.   Eyes:      General: No scleral icterus.        Right eye: No discharge.         Left eye: No discharge.      Extraocular Movements: Extraocular movements intact.      Conjunctiva/sclera: Conjunctivae normal.      Pupils: Pupils are equal, round, and reactive to light.   Cardiovascular:      Rate and Rhythm: Normal rate and regular rhythm.      Pulses: Normal pulses.      Heart sounds: Normal heart sounds. No murmur heard.   No friction rub. No gallop.    Pulmonary:      Effort: Pulmonary effort is normal. No respiratory distress.      Breath sounds: Normal breath sounds. No stridor. No wheezing, rhonchi or rales.    Chest:      Chest wall: No tenderness.   Abdominal:      General: Abdomen is flat. Bowel sounds are normal. There is no distension.      Palpations: Abdomen is soft. There is no mass.      Tenderness: There is no abdominal tenderness. There is no right CVA tenderness, left CVA tenderness, guarding or rebound.      Hernia: No hernia is present.   Musculoskeletal:      Cervical back: Normal range of motion. Tenderness present.   Lymphadenopathy:      Cervical: No cervical adenopathy.   Neurological:      General: No focal deficit present.      Mental Status: She is alert and oriented to person, place, and time. Mental status is at baseline.   Psychiatric:         Mood and Affect: Mood normal.         Behavior: Behavior normal.         Thought Content: Thought content normal.         Judgment: Judgment normal.          Results for orders placed or performed in visit on 08/15/21 (from the past 24 hour(s))   Streptococcus A Rapid Screen w/Reflex to PCR - Clinic Collect    Specimen: Throat; Swab   Result Value Ref Range    Group A Strep antigen Negative Negative   Mononucleosis screen   Result Value Ref Range    Mononucleosis Screen Negative Negative

## 2021-08-15 NOTE — PATIENT INSTRUCTIONS
Patient Education     Self-Care for Sore Throats     Sore throats happen for many reasons, such as colds, allergies, cigarette smoke, air pollution, and infections caused by viruses or bacteria. In any case, your throat becomes red and sore. Your goal for self-care is to reduce your discomfort while giving your throat a chance to heal.  Moisten and soothe your throat  Tips include the following:    Try a sip of water first thing after waking up.    Keep your throat moist by drinking 6 or more glasses of clear liquids every day.    Run a cool-air humidifier in your room overnight.    Stay away from cigarette smoke.     Check the air quality index,if air pollution gives you a sore throat. On high pollution days, try to limit outdoor time.    Suck on throat lozenges, cough drops, hard candy, ice chips, or frozen fruit-juice bars. Use the sugar-free versions if your diet or medical condition requires them.  Gargle to ease irritation  Gargling every hour or 2 can ease irritation. Try gargling with 1 of these solutions:    1/4 teaspoon of salt in 1/2 cup of warm water    An over-the-counter anesthetic gargle  Use medicine for more relief  Over-the-counter medicine can reduce sore throat symptoms. Ask your pharmacist if you have questions about which medicine to use. To prevent possible medicine interactions, let the pharmacist know what medicines you take. To decrease symptoms:    Ease pain with anesthetic sprays. Aspirin or an aspirin substitute also helps. Remember, never give aspirin to anyone 18 or younger. Don't take aspirin if you are already taking blood thinners.     For sore throats caused by allergies, try antihistamines to block the allergic reaction.  Unless a sore throat is caused by a bacterial infection, antibiotics won t help you.  Prevent future sore throats  Prevention tips include:    Stop smoking or reduce contact with secondhand smoke. Smoke irritates the tender throat lining.    Limit contact with  pets and with allergy-causing substances, such as pollen and mold.    Wash your hands often when you re around someone with a sore throat or cold. This will keep viruses or bacteria from spreading.    Limit outdoor time when air pollution is bad.    Don t strain your vocal cords.  When to call your healthcare provider  Contact your healthcare provider if you have:    Fever of 100.4 F (38.0 C) or higher, or as directed by your healthcare provider    White spots on the throat    Great Trouble swallowing    A skin rash    Recent exposure to someone else with strep bacteria    Severe hoarseness and swollen glands in the neck or jaw  Call 911  Call 911 if any of the following occur:    Trouble breathing or catching your breath    Drooling and problems swallowing    Wheezing    Unable to talk    Feeling dizzy or faint    Feeling of doom  [employer] last reviewed this educational content on 9/1/2019 2000-2021 [employer]. All rights reserved. This information is not intended as a substitute for professional medical care. Always follow your healthcare professional's instructions.

## 2021-08-16 LAB — GROUP A STREP BY PCR: NOT DETECTED

## 2021-10-11 ENCOUNTER — HEALTH MAINTENANCE LETTER (OUTPATIENT)
Age: 26
End: 2021-10-11

## 2022-03-27 ENCOUNTER — HEALTH MAINTENANCE LETTER (OUTPATIENT)
Age: 27
End: 2022-03-27

## 2022-09-24 ENCOUNTER — HEALTH MAINTENANCE LETTER (OUTPATIENT)
Age: 27
End: 2022-09-24

## 2023-05-08 ENCOUNTER — HEALTH MAINTENANCE LETTER (OUTPATIENT)
Age: 28
End: 2023-05-08

## 2023-09-13 ENCOUNTER — APPOINTMENT (OUTPATIENT)
Dept: URBAN - METROPOLITAN AREA CLINIC 258 | Age: 28
Setting detail: DERMATOLOGY
End: 2023-09-14

## 2023-09-13 VITALS — HEIGHT: 65 IN | WEIGHT: 158 LBS

## 2023-09-13 DIAGNOSIS — L81.0 POSTINFLAMMATORY HYPERPIGMENTATION: ICD-10-CM

## 2023-09-13 DIAGNOSIS — L30.4 ERYTHEMA INTERTRIGO: ICD-10-CM

## 2023-09-13 PROCEDURE — 99203 OFFICE O/P NEW LOW 30 MIN: CPT

## 2023-09-13 PROCEDURE — OTHER MIPS QUALITY: OTHER

## 2023-09-13 PROCEDURE — OTHER COUNSELING: OTHER

## 2023-09-13 PROCEDURE — OTHER PRESCRIPTION: OTHER

## 2023-09-13 RX ORDER — HYDROQUINONE
POWDER (GRAM) MISCELLANEOUS
Qty: 30 | Refills: 1 | Status: ERX | COMMUNITY
Start: 2023-09-13

## 2023-09-13 RX ORDER — CLOTRIMAZOLE AND BETAMETHASONE DIPROPIONATE 10; .5 MG/G; MG/G
CREAM TOPICAL BID
Qty: 45 | Refills: 1 | Status: ERX | COMMUNITY
Start: 2023-09-13

## 2023-09-13 ASSESSMENT — LOCATION DETAILED DESCRIPTION DERM
LOCATION DETAILED: RIGHT AXILLARY VAULT
LOCATION DETAILED: LEFT AXILLARY VAULT
LOCATION DETAILED: RIGHT ANTERIOR PROXIMAL THIGH

## 2023-09-13 ASSESSMENT — LOCATION SIMPLE DESCRIPTION DERM
LOCATION SIMPLE: RIGHT AXILLARY VAULT
LOCATION SIMPLE: RIGHT THIGH
LOCATION SIMPLE: LEFT AXILLARY VAULT

## 2023-09-13 ASSESSMENT — LOCATION ZONE DERM
LOCATION ZONE: AXILLAE
LOCATION ZONE: LEG

## 2023-09-13 NOTE — HPI: DISCOLORATION (HYPERPIGMENTATION)
Is This A New Presentation, Or A Follow-Up?: Discoloration
Additional History: Patient reports getting bit my a dog 1.5 months ago. The area has left discoloration from where the puncture wound was on her right upper thigh. She would like to know if the scarring/hyperpigmentation will resolve eventually and if it is healing properly.

## 2023-09-13 NOTE — HPI: RASH
Is This A New Presentation, Or A Follow-Up?: Rash
Additional History: Patient reports having a red and itchy rash under her arms. She reports that she has not tried any new deodorant and has shaved under arms normally. She is unsure of what could’ve triggered it. She reports that her left underarm is worse then the right. She reports that the area has darker pigmentation now.

## 2024-05-11 ENCOUNTER — HEALTH MAINTENANCE LETTER (OUTPATIENT)
Age: 29
End: 2024-05-11

## 2024-06-17 PROBLEM — Z76.89 HEALTH CARE HOME: Status: RESOLVED | Noted: 2021-01-18 | Resolved: 2024-06-17

## 2024-08-16 ENCOUNTER — LAB REQUISITION (OUTPATIENT)
Dept: LAB | Facility: CLINIC | Age: 29
End: 2024-08-16
Payer: COMMERCIAL

## 2024-08-16 DIAGNOSIS — Z12.4 ENCOUNTER FOR SCREENING FOR MALIGNANT NEOPLASM OF CERVIX: ICD-10-CM

## 2024-08-16 PROCEDURE — G0145 SCR C/V CYTO,THINLAYER,RESCR: HCPCS | Mod: ORL | Performed by: OBSTETRICS & GYNECOLOGY

## 2024-08-22 LAB
BKR LAB AP GYN ADEQUACY: NORMAL
BKR LAB AP GYN INTERPRETATION: NORMAL
BKR LAB AP HPV REFLEX: NORMAL
BKR LAB AP LMP: NORMAL
BKR LAB AP PREVIOUS ABNL DX: NORMAL
BKR LAB AP PREVIOUS ABNORMAL: NORMAL
PATH REPORT.COMMENTS IMP SPEC: NORMAL
PATH REPORT.COMMENTS IMP SPEC: NORMAL
PATH REPORT.RELEVANT HX SPEC: NORMAL

## 2024-09-03 ENCOUNTER — LAB REQUISITION (OUTPATIENT)
Dept: LAB | Facility: CLINIC | Age: 29
End: 2024-09-03
Payer: COMMERCIAL

## 2024-09-03 DIAGNOSIS — Z31.41 ENCOUNTER FOR FERTILITY TESTING: ICD-10-CM

## 2024-09-03 LAB
ESTRADIOL SERPL-MCNC: 40 PG/ML
FASTING STATUS PATIENT QL REPORTED: NORMAL
FSH SERPL IRP2-ACNC: 7.8 MIU/ML
GLUCOSE SERPL-MCNC: 91 MG/DL (ref 70–99)
HOLD SPECIMEN: NORMAL
HOLD SPECIMEN: NORMAL
INSULIN SERPL-ACNC: 5.8 UU/ML (ref 2.6–24.9)
MIS SERPL-MCNC: 3.21 NG/ML (ref 0.89–9.9)
PROLACTIN SERPL 3RD IS-MCNC: 18 NG/ML (ref 5–23)
SHBG SERPL-SCNC: 69 NMOL/L (ref 30–135)
TSH SERPL DL<=0.005 MIU/L-ACNC: 3.54 UIU/ML (ref 0.3–4.2)

## 2024-09-03 PROCEDURE — 83525 ASSAY OF INSULIN: CPT | Mod: ORL | Performed by: OBSTETRICS & GYNECOLOGY

## 2024-09-03 PROCEDURE — 82947 ASSAY GLUCOSE BLOOD QUANT: CPT | Mod: ORL | Performed by: OBSTETRICS & GYNECOLOGY

## 2024-09-03 PROCEDURE — 84403 ASSAY OF TOTAL TESTOSTERONE: CPT | Mod: ORL | Performed by: OBSTETRICS & GYNECOLOGY

## 2024-09-03 PROCEDURE — 84443 ASSAY THYROID STIM HORMONE: CPT | Mod: ORL | Performed by: OBSTETRICS & GYNECOLOGY

## 2024-09-03 PROCEDURE — 83498 ASY HYDROXYPROGESTERONE 17-D: CPT | Mod: ORL | Performed by: OBSTETRICS & GYNECOLOGY

## 2024-09-03 PROCEDURE — 82166 ASSAY ANTI-MULLERIAN HORM: CPT | Mod: ORL | Performed by: OBSTETRICS & GYNECOLOGY

## 2024-09-03 PROCEDURE — 84270 ASSAY OF SEX HORMONE GLOBUL: CPT | Mod: ORL | Performed by: OBSTETRICS & GYNECOLOGY

## 2024-09-03 PROCEDURE — 84146 ASSAY OF PROLACTIN: CPT | Mod: ORL | Performed by: OBSTETRICS & GYNECOLOGY

## 2024-09-03 PROCEDURE — 82670 ASSAY OF TOTAL ESTRADIOL: CPT | Mod: ORL | Performed by: OBSTETRICS & GYNECOLOGY

## 2024-09-03 PROCEDURE — 83001 ASSAY OF GONADOTROPIN (FSH): CPT | Performed by: OBSTETRICS & GYNECOLOGY

## 2024-09-05 LAB — TESTOST SERPL-MCNC: 20 NG/DL (ref 8–60)

## 2024-09-19 LAB — 17OHP SERPL-MCNC: 33 NG/DL
